# Patient Record
Sex: FEMALE | Race: BLACK OR AFRICAN AMERICAN | NOT HISPANIC OR LATINO | Employment: OTHER | URBAN - METROPOLITAN AREA
[De-identification: names, ages, dates, MRNs, and addresses within clinical notes are randomized per-mention and may not be internally consistent; named-entity substitution may affect disease eponyms.]

---

## 2019-08-20 ENCOUNTER — OFFICE VISIT (OUTPATIENT)
Dept: SURGERY | Facility: CLINIC | Age: 79
End: 2019-08-20
Payer: MEDICARE

## 2019-08-20 VITALS
HEIGHT: 65 IN | RESPIRATION RATE: 16 BRPM | WEIGHT: 173 LBS | SYSTOLIC BLOOD PRESSURE: 160 MMHG | OXYGEN SATURATION: 98 % | HEART RATE: 77 BPM | DIASTOLIC BLOOD PRESSURE: 80 MMHG | BODY MASS INDEX: 28.82 KG/M2

## 2019-08-20 DIAGNOSIS — R79.1 ABNORMAL COAGULATION PROFILE: ICD-10-CM

## 2019-08-20 DIAGNOSIS — C18.9 ADENOCARCINOMA, COLON: Primary | ICD-10-CM

## 2019-08-20 DIAGNOSIS — R94.5 ABNORMAL RESULTS OF LIVER FUNCTION STUDIES: ICD-10-CM

## 2019-08-20 PROCEDURE — 99204 OFFICE O/P NEW MOD 45 MIN: CPT | Mod: S$GLB,,, | Performed by: SURGERY

## 2019-08-20 PROCEDURE — 99204 PR OFFICE/OUTPT VISIT, NEW, LEVL IV, 45-59 MIN: ICD-10-PCS | Mod: S$GLB,,, | Performed by: SURGERY

## 2019-08-20 RX ORDER — POLYETHYLENE GLYCOL 3350, SODIUM SULFATE ANHYDROUS, SODIUM BICARBONATE, SODIUM CHLORIDE, POTASSIUM CHLORIDE 236; 22.74; 6.74; 5.86; 2.97 G/4L; G/4L; G/4L; G/4L; G/4L
4 POWDER, FOR SOLUTION ORAL ONCE
Qty: 4000 ML | Refills: 0 | Status: SHIPPED | OUTPATIENT
Start: 2019-08-20 | End: 2019-08-20

## 2019-08-23 NOTE — PROGRESS NOTES
Subjective:       Patient ID: Kellie Anderson is a 79 y.o. female.    Chief Complaint: Colon Cancer    79-year-old female with new diagnosis of transverse colon mass found on colonoscopy, this is positive for colon cancer.  Patient is not having any pain or symptoms at this time, denies any nausea vomiting, having normal bowel movements.    Review of Systems   Constitutional: Negative for chills and fever.   HENT: Negative for congestion and rhinorrhea.    Eyes: Negative for discharge and visual disturbance.   Respiratory: Negative for shortness of breath and wheezing.    Cardiovascular: Negative for chest pain and palpitations.   Gastrointestinal: Negative.  Negative for abdominal distention, abdominal pain, anal bleeding, blood in stool, constipation, diarrhea, nausea, rectal pain and vomiting.   Endocrine: Negative for cold intolerance and heat intolerance.   Genitourinary: Negative for difficulty urinating and frequency.   Musculoskeletal: Negative for back pain and myalgias.   Skin: Negative for pallor and rash.   Neurological: Negative for dizziness and headaches.   Hematological: Negative for adenopathy. Does not bruise/bleed easily.   Psychiatric/Behavioral: Negative for behavioral problems. The patient is not nervous/anxious.        Objective:      Physical Exam   Constitutional: She is oriented to person, place, and time. Vital signs are normal. She appears well-developed and well-nourished. She is cooperative.   HENT:   Head: Normocephalic and atraumatic.   Eyes: Conjunctivae, EOM and lids are normal.   Neck: Trachea normal and normal range of motion.   Cardiovascular: Normal rate, regular rhythm and normal heart sounds.   Pulmonary/Chest: Effort normal and breath sounds normal.   Abdominal: Soft. Normal appearance and bowel sounds are normal. She exhibits no distension. There is no tenderness. No hernia.   Neurological: She is alert and oriented to person, place, and time. She has normal strength.   Skin:  Skin is warm, dry and intact.   Psychiatric: She has a normal mood and affect. Her speech is normal and behavior is normal.       Assessment:       No diagnosis found.    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

## 2019-08-30 ENCOUNTER — OUTSIDE PLACE OF SERVICE (OUTPATIENT)
Dept: SURGERY | Facility: CLINIC | Age: 79
End: 2019-08-30
Payer: MEDICARE

## 2019-08-30 PROCEDURE — 44204 PR LAP,SURG,COLECTOMY, PARTIAL, W/ANAST: ICD-10-PCS | Mod: AS,,, | Performed by: REGISTERED NURSE

## 2019-08-30 PROCEDURE — 44204 PR LAP,SURG,COLECTOMY, PARTIAL, W/ANAST: ICD-10-PCS | Mod: ,,, | Performed by: SURGERY

## 2019-08-30 PROCEDURE — 44213 LAP MOBIL SPLENIC FL ADD-ON: CPT | Mod: ,,, | Performed by: SURGERY

## 2019-08-30 PROCEDURE — 47562 LAPAROSCOPIC CHOLECYSTECTOMY: CPT | Mod: AS,51,, | Performed by: REGISTERED NURSE

## 2019-08-30 PROCEDURE — 44204 LAPARO PARTIAL COLECTOMY: CPT | Mod: AS,,, | Performed by: REGISTERED NURSE

## 2019-08-30 PROCEDURE — 47562 PR LAP,CHOLECYSTECTOMY: ICD-10-PCS | Mod: AS,51,, | Performed by: REGISTERED NURSE

## 2019-08-30 PROCEDURE — 44213 PR LAP, SURG MOBIL SPLENIC FL DUR PTL COLECTOMY: ICD-10-PCS | Mod: ,,, | Performed by: SURGERY

## 2019-08-30 PROCEDURE — 44213 PR LAP, SURG MOBIL SPLENIC FL DUR PTL COLECTOMY: ICD-10-PCS | Mod: AS,,, | Performed by: REGISTERED NURSE

## 2019-08-30 PROCEDURE — 44213 LAP MOBIL SPLENIC FL ADD-ON: CPT | Mod: AS,,, | Performed by: REGISTERED NURSE

## 2019-08-30 PROCEDURE — 47562 LAPAROSCOPIC CHOLECYSTECTOMY: CPT | Mod: 51,,, | Performed by: SURGERY

## 2019-08-30 PROCEDURE — 47562 PR LAP,CHOLECYSTECTOMY: ICD-10-PCS | Mod: 51,,, | Performed by: SURGERY

## 2019-08-30 PROCEDURE — 44204 LAPARO PARTIAL COLECTOMY: CPT | Mod: ,,, | Performed by: SURGERY

## 2019-09-16 ENCOUNTER — OFFICE VISIT (OUTPATIENT)
Dept: SURGERY | Facility: CLINIC | Age: 79
End: 2019-09-16
Payer: MEDICARE

## 2019-09-16 VITALS
HEART RATE: 86 BPM | WEIGHT: 167 LBS | RESPIRATION RATE: 16 BRPM | BODY MASS INDEX: 27.79 KG/M2 | SYSTOLIC BLOOD PRESSURE: 140 MMHG | DIASTOLIC BLOOD PRESSURE: 80 MMHG | OXYGEN SATURATION: 96 %

## 2019-09-16 DIAGNOSIS — Z98.890 STATUS POST SURGERY: Primary | ICD-10-CM

## 2019-09-16 DIAGNOSIS — C18.9 ADENOCARCINOMA, COLON: ICD-10-CM

## 2019-09-16 PROCEDURE — 99024 PR POST-OP FOLLOW-UP VISIT: ICD-10-PCS | Mod: S$GLB,,, | Performed by: REGISTERED NURSE

## 2019-09-16 PROCEDURE — 99024 POSTOP FOLLOW-UP VISIT: CPT | Mod: S$GLB,,, | Performed by: REGISTERED NURSE

## 2019-09-16 RX ORDER — AMLODIPINE BESYLATE 2.5 MG/1
TABLET ORAL
COMMUNITY
Start: 2019-08-29

## 2019-09-16 RX ORDER — LEVOTHYROXINE SODIUM 50 UG/1
TABLET ORAL
COMMUNITY
Start: 2019-07-25

## 2019-09-16 NOTE — PROGRESS NOTES
Kellie Anderson is a 79 y.o. female patient.   No diagnosis found.  History reviewed. No pertinent past medical history.  No past surgical history pertinent negatives on file.  Scheduled Meds:  Continuous Infusions:  PRN Meds:    Review of patient's allergies indicates:  No Known Allergies  There are no hospital problems to display for this patient.    Blood pressure (!) 140/80, pulse 86, resp. rate 16, weight 75.8 kg (167 lb), SpO2 96 %.    Subjective:   Diet: Adequate intake (Patient having normal bowel movements and passing gas without complications.).  Patient reports no nausea or vomiting.    Activity level: Normal.    Pain control: Well controlled.    Wound: Subjective wound complaints: No complaints.      Objective:  Vital signs (most recent): Blood pressure (!) 140/80, pulse 86, resp. rate 16, weight 75.8 kg (167 lb), SpO2 96 %.  General appearance: Comfortable, well-appearing, in no acute distress and not in pain.    Lungs:  Breath sounds normal.    Heart: Normal rate.    Abdomen: Abdomen is soft.    Wound:  Clean.  There is no drainage.    Extremities: There is normal range of motion.    Neurological: The patient is alert and oriented to person, place and time.       Assessment:   Post-op: 17 days.      Plan:  Encourage ambulation (No lifting anything heavier than 25 pounds for an additional 3-1/2 weeks.).  Consults: We discussed with patient referral to a radiation oncologist for evaluation.  Patient states she has follow-up with her hematology oncologist in 2 days and she will further discuss referrals or treatments with him.  Regular diet.  General Orders/Medications Plan: Follow up in my office on a prn basis.           Usman Al NP  9/16/2019

## 2022-06-24 ENCOUNTER — TELEPHONE (OUTPATIENT)
Dept: GASTROENTEROLOGY | Facility: CLINIC | Age: 82
End: 2022-06-24
Payer: MEDICARE

## 2022-06-24 NOTE — TELEPHONE ENCOUNTER
6/20/2022 Dr. Slade OV note: 6/17/2022 Hb 12.3, CBC nl, CEA 0.8, alb 3.6, Cr 0.79, CMP nl. Asked to follow up with NBP as she thought she was due for a colonoscopy this year.  KDL, notify patient her last colonoscopy was 12/2021 and she is due for her repeat colonoscopy 12/2023.  Add to HM and send last colon report to Dr. Slade's office. She should make an OV for 12/2023 to update her chart and to discuss that repeat colonoscopy.  NBP

## 2022-09-19 ENCOUNTER — TELEPHONE (OUTPATIENT)
Dept: GASTROENTEROLOGY | Facility: CLINIC | Age: 82
End: 2022-09-19
Payer: MEDICARE

## 2022-11-28 NOTE — PROGRESS NOTES
11/16/2022 Dr. Slade OV note. Make 9/2023 OV with me as a follow up. She should notify me sooner if any issues.  NBP

## 2023-12-07 ENCOUNTER — OFFICE VISIT (OUTPATIENT)
Dept: GASTROENTEROLOGY | Facility: CLINIC | Age: 83
End: 2023-12-07
Payer: MEDICARE

## 2023-12-07 VITALS
OXYGEN SATURATION: 99 % | DIASTOLIC BLOOD PRESSURE: 77 MMHG | HEART RATE: 77 BPM | HEIGHT: 65 IN | WEIGHT: 164 LBS | SYSTOLIC BLOOD PRESSURE: 146 MMHG | BODY MASS INDEX: 27.32 KG/M2

## 2023-12-07 DIAGNOSIS — K59.00 CONSTIPATION, UNSPECIFIED CONSTIPATION TYPE: ICD-10-CM

## 2023-12-07 DIAGNOSIS — Z86.010 HISTORY OF COLON POLYPS: ICD-10-CM

## 2023-12-07 DIAGNOSIS — Z85.038 HISTORY OF COLON CANCER: ICD-10-CM

## 2023-12-07 DIAGNOSIS — K92.1 HEMATOCHEZIA: Primary | ICD-10-CM

## 2023-12-07 PROCEDURE — 99214 PR OFFICE/OUTPT VISIT, EST, LEVL IV, 30-39 MIN: ICD-10-PCS | Mod: S$GLB,,, | Performed by: INTERNAL MEDICINE

## 2023-12-07 PROCEDURE — 1159F PR MEDICATION LIST DOCUMENTED IN MEDICAL RECORD: ICD-10-PCS | Mod: CPTII,S$GLB,, | Performed by: INTERNAL MEDICINE

## 2023-12-07 PROCEDURE — 1101F PT FALLS ASSESS-DOCD LE1/YR: CPT | Mod: CPTII,S$GLB,, | Performed by: INTERNAL MEDICINE

## 2023-12-07 PROCEDURE — 3077F SYST BP >= 140 MM HG: CPT | Mod: CPTII,S$GLB,, | Performed by: INTERNAL MEDICINE

## 2023-12-07 PROCEDURE — 3288F PR FALLS RISK ASSESSMENT DOCUMENTED: ICD-10-PCS | Mod: CPTII,S$GLB,, | Performed by: INTERNAL MEDICINE

## 2023-12-07 PROCEDURE — 3078F DIAST BP <80 MM HG: CPT | Mod: CPTII,S$GLB,, | Performed by: INTERNAL MEDICINE

## 2023-12-07 PROCEDURE — 3077F PR MOST RECENT SYSTOLIC BLOOD PRESSURE >= 140 MM HG: ICD-10-PCS | Mod: CPTII,S$GLB,, | Performed by: INTERNAL MEDICINE

## 2023-12-07 PROCEDURE — 99214 OFFICE O/P EST MOD 30 MIN: CPT | Mod: S$GLB,,, | Performed by: INTERNAL MEDICINE

## 2023-12-07 PROCEDURE — 3288F FALL RISK ASSESSMENT DOCD: CPT | Mod: CPTII,S$GLB,, | Performed by: INTERNAL MEDICINE

## 2023-12-07 PROCEDURE — 3078F PR MOST RECENT DIASTOLIC BLOOD PRESSURE < 80 MM HG: ICD-10-PCS | Mod: CPTII,S$GLB,, | Performed by: INTERNAL MEDICINE

## 2023-12-07 PROCEDURE — 1160F PR REVIEW ALL MEDS BY PRESCRIBER/CLIN PHARMACIST DOCUMENTED: ICD-10-PCS | Mod: CPTII,S$GLB,, | Performed by: INTERNAL MEDICINE

## 2023-12-07 PROCEDURE — 1101F PR PT FALLS ASSESS DOC 0-1 FALLS W/OUT INJ PAST YR: ICD-10-PCS | Mod: CPTII,S$GLB,, | Performed by: INTERNAL MEDICINE

## 2023-12-07 PROCEDURE — 1160F RVW MEDS BY RX/DR IN RCRD: CPT | Mod: CPTII,S$GLB,, | Performed by: INTERNAL MEDICINE

## 2023-12-07 PROCEDURE — 1159F MED LIST DOCD IN RCRD: CPT | Mod: CPTII,S$GLB,, | Performed by: INTERNAL MEDICINE

## 2023-12-07 RX ORDER — HYDROCHLOROTHIAZIDE 12.5 MG/1
TABLET ORAL
COMMUNITY
Start: 2023-11-01

## 2023-12-07 RX ORDER — ATORVASTATIN CALCIUM 10 MG/1
TABLET, FILM COATED ORAL
COMMUNITY
Start: 2023-11-01

## 2023-12-07 NOTE — PATIENT INSTRUCTIONS
Schedule colonoscopy.     Please notify my office if you have not been contacted within two weeks after any procedures, submitting any samples (biopsies, blood, stool, urine, etc.) or after any imaging (X-ray, CT, MRI, etc.).

## 2023-12-07 NOTE — LETTER
December 7, 2023        Ke Foster MD  [facility] [address] [place] [address]             [place] [facility]  [address] [place] [address]  Phone: [phone]  Fax: [phone]   Patient: Kellie Anderson   MR Number: [id number]   YOB: 1940   Date of Visit: 12/7/2023       Dear Dr. Foster:    Thank you for referring Kellie Anderson to me for evaluation. Attached you will find relevant portions of my assessment and plan of care.    If you have questions, please do not hesitate to call me. I look forward to following Kellie Anderson along with you.    Sincerely,      Cathie Hooks MD            CC  No Recipients    Enclosure

## 2023-12-07 NOTE — PROGRESS NOTES
Clinic Note    Reason for visit:  The primary encounter diagnosis was Hematochezia. Diagnoses of History of colon cancer, History of colon polyps, and Constipation, unspecified constipation type were also pertinent to this visit.    PCP: Ke Foster.       HPI:  This is a 83 y.o. female who is established. Patient with h/o colon cancer s/p resection. Patient states she still has blood on toilet paper with every BM. No pain. She is taking MiraLAX daily and usually has BM daily or every other day. She takes Aleve occasionally but states it is rare. She sees Dr. Rollins next week. She has labs scheduled for tomorrow.      6/17/2022 Hb 12.3, CBC nl, CEA 0.8, alb 3.6, Cr 0.79, CMP nl.     Colonoscopy 12/2/2021: 2 benign, 1 hyp/lymp, repeat colon in 2y.  Colonoscopy 10/26/2020: 8 polyps: 6 TA, 1 benign. Anas Bx active infl, no dysplasia. Anas polyp Bx prolapse/trauma, no dysplasia.    8/30/2019 robotic left hemicolectomy, raúl. Path: adenocarcinoma, grade 2 of 4, 0/18, 2.3cm, margins clear, invades pericolic tissues. pT3 pN0. Chr raúl, GS.    2019 CT AP IV/PO: liver nl, calcification in GB wall, ownl    Review of Systems   Constitutional:  Negative for fatigue, fever and unexpected weight change.   HENT:  Negative for mouth sores, postnasal drip, sore throat and trouble swallowing.    Eyes:  Negative for pain, discharge and eye dryness.   Respiratory:  Negative for apnea, cough, choking, chest tightness, shortness of breath and wheezing.    Cardiovascular:  Negative for chest pain, palpitations and leg swelling.   Gastrointestinal:  Negative for abdominal distention, abdominal pain, anal bleeding, blood in stool, change in bowel habit, constipation, diarrhea, nausea, rectal pain, vomiting, reflux and fecal incontinence.   Genitourinary:  Negative for bladder incontinence, dysuria and hematuria.   Musculoskeletal:  Negative for arthralgias, back pain and joint swelling.   Integumentary:  Negative for color change and  "rash.   Allergic/Immunologic: Negative for environmental allergies and food allergies.   Neurological:  Negative for seizures and headaches.   Hematological:  Negative for adenopathy. Does not bruise/bleed easily.        Past Medical History:   Diagnosis Date    Disorder of thyroid, unspecified     Essential (primary) hypertension     High cholesterol     History of colon cancer, stage II 08/2019    s/p resection    Personal history of colonic polyps      Past Surgical History:   Procedure Laterality Date    CHOLECYSTECTOMY      ROBOT-ASSISTED COLECTOMY  08/30/2019    Left Hemicolectomy, Mobilization of Splenic Flexure, Cholecystectomy     TUBAL LIGATION       Family History   Problem Relation Age of Onset    Brain aneurysm Mother     Colon cancer Neg Hx     Colon polyps Neg Hx     Esophageal cancer Neg Hx     Inflammatory bowel disease Neg Hx     Irritable bowel syndrome Neg Hx     Liver cancer Neg Hx     Liver disease Neg Hx     Rectal cancer Neg Hx     Stomach cancer Neg Hx      Social History     Tobacco Use    Smoking status: Former     Types: Cigarettes    Smokeless tobacco: Former    Tobacco comments:     Micha in 1989   Substance Use Topics    Alcohol use: Never    Drug use: Never     Review of patient's allergies indicates:  No Known Allergies     Medication List with Changes/Refills   Current Medications    AMLODIPINE (NORVASC) 2.5 MG TABLET        ATORVASTATIN (LIPITOR) 10 MG TABLET        HYDROCHLOROTHIAZIDE (HYDRODIURIL) 12.5 MG TAB        LEVOTHYROXINE (SYNTHROID) 50 MCG TABLET             Vital Signs:  BP (!) 146/77 (BP Location: Left arm, Patient Position: Sitting)   Pulse 77   Ht 5' 5" (1.651 m)   Wt 74.4 kg (164 lb)   SpO2 99%   BMI 27.29 kg/m²         Physical Exam  Vitals reviewed.   Constitutional:       General: She is awake. She is not in acute distress.     Appearance: Normal appearance. She is well-developed. She is not ill-appearing, toxic-appearing or diaphoretic.   HENT:      Head: " Normocephalic and atraumatic.      Nose: Nose normal.      Mouth/Throat:      Mouth: Mucous membranes are moist.      Pharynx: Oropharynx is clear. No oropharyngeal exudate or posterior oropharyngeal erythema.   Eyes:      General: Lids are normal. Gaze aligned appropriately. No scleral icterus.        Right eye: No discharge.         Left eye: No discharge.      Conjunctiva/sclera: Conjunctivae normal.   Neck:      Trachea: Trachea normal.   Cardiovascular:      Rate and Rhythm: Normal rate and regular rhythm.      Pulses:           Radial pulses are 2+ on the right side and 2+ on the left side.   Pulmonary:      Effort: Pulmonary effort is normal. No respiratory distress.      Breath sounds: No stridor. No wheezing.   Chest:      Chest wall: No tenderness.   Abdominal:      General: Bowel sounds are normal. There is no distension.      Palpations: Abdomen is soft. There is no fluid wave, hepatomegaly or mass.      Tenderness: There is no abdominal tenderness. There is no guarding or rebound.   Musculoskeletal:         General: No tenderness or deformity.      Cervical back: Full passive range of motion without pain and neck supple. No tenderness.      Right lower leg: No edema.      Left lower leg: No edema.   Lymphadenopathy:      Cervical: No cervical adenopathy.   Skin:     General: Skin is warm and dry.      Capillary Refill: Capillary refill takes less than 2 seconds.      Coloration: Skin is not cyanotic, jaundiced or pale.   Neurological:      General: No focal deficit present.      Mental Status: She is alert and oriented to person, place, and time.      Motor: No tremor.   Psychiatric:         Attention and Perception: Attention normal.         Mood and Affect: Mood and affect normal.         Speech: Speech normal.         Behavior: Behavior normal. Behavior is cooperative.            All of the data above and below has been reviewed by myself and any further interpretations will be reflected in the  assessment and plan.   The data includes review of external notes, and independent interpretation of lab results, procedures, x-rays, and imaging reports.      Assessment:  Hematochezia  -     Ambulatory Referral to External Surgery    History of colon cancer  Comments:  Stage IIA s/p resection 2019  Orders:  -     Ambulatory Referral to External Surgery    History of colon polyps  -     Ambulatory Referral to External Surgery    Constipation, unspecified constipation type    Taking MiraLAX daily. BMs daily or QOD.   Due for colonoscopy. Sutab sample given.     Recommendations:  Schedule colonoscopy.     Risks, benefits, and alternatives of medical management, any associated procedures, and/or treatment discussed with the patient. Patient given opportunity to ask questions and voices understanding. Patient has elected to proceed with the recommended care modalities as discussed.    Instructed patient to notify my office if they have not been contacted within two weeks after any procedures, submitting any samples (biopsies, blood, stool, urine, etc.) or after any imaging (X-ray, CT, MRI, etc.).     Follow up in about 1 year (around 12/7/2024).    Order summary:  Orders Placed This Encounter   Procedures    Ambulatory Referral to External Surgery      This assessment, plan, and documentation was performed in collaboration with Rebekah Walker NP.      This document may have been created using a voice recognition transcribing system. Incorrect words or phrases may have been missed during proofreading. Please interpret accordingly or contact me for clarification.     Cathie Hooks MD

## 2024-04-11 ENCOUNTER — TELEPHONE (OUTPATIENT)
Dept: GASTROENTEROLOGY | Facility: CLINIC | Age: 84
End: 2024-04-11
Payer: MEDICARE

## 2024-04-11 DIAGNOSIS — Z86.010 HISTORY OF COLON POLYPS: ICD-10-CM

## 2024-04-11 DIAGNOSIS — Z85.038 HISTORY OF COLON CANCER: ICD-10-CM

## 2024-04-11 DIAGNOSIS — K92.1 HEMATOCHEZIA: Primary | ICD-10-CM

## 2024-04-11 NOTE — TELEPHONE ENCOUNTER
Staff returned pt call.. she wanted to know when someone was going to call her w/time to come in for procedure.  Staff went over all the instructions for the colonoscopy.  Staff also advised pt to pre register next week for the procedure. Staff told her either go to the hospital or call the number listed on the top of the instruction sheet.   Staff advised pt to call ofc if she had any other questions. --sylvia

## 2024-04-11 NOTE — TELEPHONE ENCOUNTER
----- Message from Jessie Meyers sent at 4/11/2024  3:13 PM CDT -----  Contact: self  Type:  Patient Returning Call    Who Called:Kellie Anderson  Who Left Message for Patient:unsure  Does the patient know what this is regarding?:pre op instructions update  Would the patient rather a call back or a response via [facility]?   Best Call Back Number:[phone]  Additional Information: n/a

## 2024-04-16 VITALS — WEIGHT: 164 LBS | BODY MASS INDEX: 27.32 KG/M2 | HEIGHT: 65 IN

## 2024-04-16 NOTE — TELEPHONE ENCOUNTER
"[place] [facility]  [address] [place] [address]  Phone: [phone]  Fax: [phone]    History & Physical         Provider: Dr. Cathie Hooks    Patient Name: Kellie VIERA (age):1940  84 y.o.           Gender: female   Phone: [phone]     Referring Physician: No, Primary Doctor     Vital Signs:   Height - 5' 5"  Weight - 164 ln  BMI -  27.29    Plan: Colonoscopy @ [facility]    Encounter Diagnoses   Name Primary?    Hematochezia Yes    History of colon cancer     History of colon polyps            History:      Past Medical History:   Diagnosis Date    Disorder of thyroid, unspecified     Essential (primary) hypertension     High cholesterol     History of colon cancer, stage II 2019    s/p resection    Personal history of colonic polyps       Past Surgical History:   Procedure Laterality Date    CHOLECYSTECTOMY      ROBOT-ASSISTED COLECTOMY  2019    Left Hemicolectomy, Mobilization of Splenic Flexure, Cholecystectomy     TUBAL LIGATION        Medication List with Changes/Refills   Current Medications    AMLODIPINE (NORVASC) 2.5 MG TABLET        ATORVASTATIN (LIPITOR) 10 MG TABLET        HYDROCHLOROTHIAZIDE (HYDRODIURIL) 12.5 MG TAB        LEVOTHYROXINE (SYNTHROID) 50 MCG TABLET          Review of patient's allergies indicates:  No Known Allergies   Family History   Problem Relation Name Age of Onset    Brain aneurysm Mother      Colon cancer Neg Hx      Colon polyps Neg Hx      Esophageal cancer Neg Hx      Inflammatory bowel disease Neg Hx      Irritable bowel syndrome Neg Hx      Liver cancer Neg Hx      Liver disease Neg Hx      Rectal cancer Neg Hx      Stomach cancer Neg Hx        Social History     Tobacco Use    Smoking status: Former     Types: Cigarettes    Smokeless tobacco: Former    Tobacco comments:     Micha in    Substance Use Topics    Alcohol use: Never    Drug use: " Never        Physical Examination:     General Appearance:___________________________  HEENT: _____________________________________  Abdomen:____________________________________  Heart:________________________________________  Lungs:_______________________________________  Extremities:___________________________________  Skin:_________________________________________  Endocrine:____________________________________  Genitourinary:_________________________________  Neurological:__________________________________      Patient has been evaluated immediately prior to sedation and is medically cleared for endoscopy with IVCS as an ASA class: ______      Physician Signature: _________________________       Date: ________  Time: ________

## 2024-04-23 ENCOUNTER — NURSE TRIAGE (OUTPATIENT)
Dept: ADMINISTRATIVE | Facility: CLINIC | Age: 84
End: 2024-04-23
Payer: MEDICARE

## 2024-04-23 NOTE — TELEPHONE ENCOUNTER
Message  Received: Today   Appointment Access, Pt Advice  Derrell Alfonso Staff  Caller: KELLIE BARKER [[phone]] (Today,  1:17 PM)  Type:  Patient Returning Call    Who Called: Kellie Barker  Who Left Message for Patient: Gina Salinas MA  Does the patient know what this is regarding?:Procedure tomorrow  Would the patient rather a call back or a response via [facility]? Call Back, if no answer please try one more time  Best Call Back Number: [phone]  Additional Information: n/a

## 2024-04-23 NOTE — TELEPHONE ENCOUNTER
Spoke with patient. I told her that [facility] would be calling her some time this afternoon to give her the arrival time for her procedure tomorrow. Patient voiced understanding. DMP

## 2024-04-23 NOTE — TELEPHONE ENCOUNTER
Message  Received: Today   Pt Advice  Yudelka Schilling Staff  Caller: self (Today, 12:03 PM)  Type: Staff Message    Caller: Kellie Anderson  Call Back Number: [phone]  Nature of the Call:  start the colon prep and  time of arrival  Additional Information: na

## 2024-04-23 NOTE — TELEPHONE ENCOUNTER
Message  Received: Today   Pt Advice  María Elena Patel Staff  Caller: PT (Today, 12:58 PM)  Patient Call Back    Patient Name: Kellie Anderson  Nature of Call: Courtesy Requested for [facility]  Call Back Number:.[phone]  Additional Information: n/a

## 2024-04-24 NOTE — TELEPHONE ENCOUNTER
Message  Received: Today   Pt Advice, Appointment Access  Elise Collins Staff  Caller: aleida/daughter (Today,  2:12 PM)  Aleida is calling regarding scheduling colonoscopy.  Please give her a call back at [phone]

## 2024-04-24 NOTE — TELEPHONE ENCOUNTER
Pt is calling because she did not receive call with arrival time and when to take prep for colonoscopy tomorrow. Pt is supposed to have procedure at [facility] with Dr. Hooks. SUBTAB pills is the prep. Spoke with house supervisor  who said to be at facility for 0700 but does no know when patient is supposed to take prep. Attempted to call number on patient AVS for advice. No answer. Call back to [facility] and spoke with Jeffrey house supervisor who does not have anyone he can reach out to on-call to provide with prep instructions. Pt has written instructions but was instructed not to follow the times listed on it and to go by what times she was called with. Recommended patient not take her prep pills and consider rescheduling her colonoscopy with further instructions so she doesn't risk having to do prep twice. Pt v/u.         Reason for Disposition   Question about upcoming scheduled test, no triage required and triager able to answer question    Protocols used: Information Only Call - No Triage-A-

## 2024-08-20 ENCOUNTER — TELEPHONE (OUTPATIENT)
Dept: GASTROENTEROLOGY | Facility: CLINIC | Age: 84
End: 2024-08-20
Payer: MEDICARE

## 2024-08-20 VITALS — WEIGHT: 164 LBS | HEIGHT: 65 IN | BODY MASS INDEX: 27.32 KG/M2

## 2024-08-20 DIAGNOSIS — Z86.010 HISTORY OF COLON POLYPS: ICD-10-CM

## 2024-08-20 DIAGNOSIS — Z85.038 HISTORY OF COLON CANCER: ICD-10-CM

## 2024-08-20 DIAGNOSIS — K92.1 HEMATOCHEZIA: Primary | ICD-10-CM

## 2024-08-20 NOTE — TELEPHONE ENCOUNTER
S/w pt's daughter and told her that I was calling as a courtesy regarding pt's up coming Colon with NBP on 8/26/24, Monday and wanted to verify that she has her paper prep instructions and meds. Pt's daughter stated pt has everything. I also mentioned that [facility] will call the day before (FRI) with the arrival time, GI Lab is located on the third floor, and to pre-register any time this week. bryan

## 2024-08-20 NOTE — TELEPHONE ENCOUNTER
"[facility]  [address] [place] [address]  Phone: [phone]  Fax: [phone]    History & Physical         Provider: Dr. Cathie Hooks    Patient Name: Kellie VIERA (age):1940  84 y.o.           Gender: female   Phone: [phone]     Referring Physician: No, Primary Doctor     Vital Signs:   Height - 5' 5"  Weight - 164 lb  BMI -  27.29    Plan: Colonoscopy @ [facility]    Encounter Diagnoses   Name Primary?    Hematochezia Yes    History of colon cancer     History of colon polyps            History:      Past Medical History:   Diagnosis Date    Disorder of thyroid, unspecified     Essential (primary) hypertension     High cholesterol     History of colon cancer, stage II 2019    s/p resection    Personal history of colonic polyps       Past Surgical History:   Procedure Laterality Date    CHOLECYSTECTOMY      ROBOT-ASSISTED COLECTOMY  2019    Left Hemicolectomy, Mobilization of Splenic Flexure, Cholecystectomy     TUBAL LIGATION        Medication List with Changes/Refills   Current Medications    AMLODIPINE (NORVASC) 2.5 MG TABLET        ATORVASTATIN (LIPITOR) 10 MG TABLET        HYDROCHLOROTHIAZIDE (HYDRODIURIL) 12.5 MG TAB        LEVOTHYROXINE (SYNTHROID) 50 MCG TABLET          Review of patient's allergies indicates:  No Known Allergies   Family History   Problem Relation Name Age of Onset    Brain aneurysm Mother      Colon cancer Neg Hx      Colon polyps Neg Hx      Esophageal cancer Neg Hx      Inflammatory bowel disease Neg Hx      Irritable bowel syndrome Neg Hx      Liver cancer Neg Hx      Liver disease Neg Hx      Rectal cancer Neg Hx      Stomach cancer Neg Hx        Social History     Tobacco Use    Smoking status: Former     Types: Cigarettes    Smokeless tobacco: Former    Tobacco comments:     Micha in    Substance Use Topics    Alcohol use: Never    Drug use: " Never        Physical Examination:     General Appearance:___________________________  HEENT: _____________________________________  Abdomen:____________________________________  Heart:________________________________________  Lungs:_______________________________________  Extremities:___________________________________  Skin:_________________________________________  Endocrine:____________________________________  Genitourinary:_________________________________  Neurological:__________________________________      Patient has been evaluated immediately prior to sedation and is medically cleared for endoscopy with IVCS as an ASA class: ______      Physician Signature: _________________________       Date: ________  Time: ________

## 2024-08-26 ENCOUNTER — OUTSIDE PLACE OF SERVICE (OUTPATIENT)
Dept: GASTROENTEROLOGY | Facility: CLINIC | Age: 84
End: 2024-08-26

## 2024-08-26 LAB — CRC RECOMMENDATION EXT: NORMAL

## 2024-09-07 ENCOUNTER — TELEPHONE (OUTPATIENT)
Dept: GASTROENTEROLOGY | Facility: CLINIC | Age: 84
End: 2024-09-07
Payer: MEDICARE

## 2024-09-08 NOTE — TELEPHONE ENCOUNTER
1 TVA, 4 TA, 1 cec hyp, 1 benign, repeat colonoscopy evaluation in 3 years.     Notify patient. Confirm recall tab in appointment desk is up-to-date (update if needed).  NBP

## 2024-09-12 NOTE — TELEPHONE ENCOUNTER
Spoke with patient's daughter and gave results from colon polyps. Daughter verbalized understanding. Recall tab is up to date. CLEMENTINE

## 2024-09-24 ENCOUNTER — DOCUMENTATION ONLY (OUTPATIENT)
Dept: GASTROENTEROLOGY | Facility: CLINIC | Age: 84
End: 2024-09-24
Payer: MEDICARE